# Patient Record
Sex: FEMALE | Race: WHITE | NOT HISPANIC OR LATINO | Employment: OTHER | URBAN - METROPOLITAN AREA
[De-identification: names, ages, dates, MRNs, and addresses within clinical notes are randomized per-mention and may not be internally consistent; named-entity substitution may affect disease eponyms.]

---

## 2017-06-14 ENCOUNTER — OFFICE VISIT (OUTPATIENT)
Dept: OBSTETRICS AND GYNECOLOGY | Facility: CLINIC | Age: 77
End: 2017-06-14
Payer: MEDICARE

## 2017-06-14 VITALS — HEIGHT: 65 IN

## 2017-06-14 DIAGNOSIS — N39.3 URINE, INCONTINENCE, STRESS FEMALE: ICD-10-CM

## 2017-06-14 DIAGNOSIS — N76.2 ACUTE VULVITIS: Primary | ICD-10-CM

## 2017-06-14 PROCEDURE — 87186 SC STD MICRODIL/AGAR DIL: CPT

## 2017-06-14 PROCEDURE — 87077 CULTURE AEROBIC IDENTIFY: CPT

## 2017-06-14 PROCEDURE — 99203 OFFICE O/P NEW LOW 30 MIN: CPT | Mod: PBBFAC,PO | Performed by: NURSE PRACTITIONER

## 2017-06-14 PROCEDURE — 99204 OFFICE O/P NEW MOD 45 MIN: CPT | Mod: S$PBB,,, | Performed by: NURSE PRACTITIONER

## 2017-06-14 PROCEDURE — 87070 CULTURE OTHR SPECIMN AEROBIC: CPT

## 2017-06-14 PROCEDURE — 1159F MED LIST DOCD IN RCRD: CPT | Mod: ,,, | Performed by: NURSE PRACTITIONER

## 2017-06-14 PROCEDURE — 1126F AMNT PAIN NOTED NONE PRSNT: CPT | Mod: ,,, | Performed by: NURSE PRACTITIONER

## 2017-06-14 PROCEDURE — 99999 PR PBB SHADOW E&M-NEW PATIENT-LVL III: CPT | Mod: PBBFAC,,, | Performed by: NURSE PRACTITIONER

## 2017-06-14 RX ORDER — MEMANTINE HYDROCHLORIDE 10 MG/1
5 TABLET ORAL 2 TIMES DAILY
COMMUNITY

## 2017-06-14 RX ORDER — GALANTAMINE HYDROBROMIDE 8 MG/1
8 CAPSULE, EXTENDED RELEASE ORAL
COMMUNITY

## 2017-06-14 RX ORDER — ALPRAZOLAM 0.5 MG/1
0.5 TABLET ORAL
COMMUNITY
Start: 2017-01-03

## 2017-06-14 RX ORDER — LEVOTHYROXINE SODIUM 50 UG/1
TABLET ORAL
COMMUNITY
Start: 2017-04-24

## 2017-06-14 RX ORDER — SERTRALINE HYDROCHLORIDE 50 MG/1
100 TABLET, FILM COATED ORAL
COMMUNITY

## 2017-06-14 RX ORDER — BISOPROLOL FUMARATE AND HYDROCHLOROTHIAZIDE 2.5; 6.25 MG/1; MG/1
TABLET ORAL
COMMUNITY
Start: 2017-02-20

## 2017-06-14 RX ORDER — MELOXICAM 7.5 MG/1
TABLET ORAL
COMMUNITY
Start: 2017-04-24

## 2017-06-14 RX ORDER — SIMVASTATIN 20 MG/1
TABLET, FILM COATED ORAL
COMMUNITY
Start: 2016-11-25

## 2017-06-14 RX ORDER — CELECOXIB 100 MG/1
100 CAPSULE ORAL 2 TIMES DAILY
COMMUNITY
End: 2017-08-01

## 2017-06-14 RX ORDER — QUETIAPINE FUMARATE 25 MG/1
TABLET, FILM COATED ORAL
COMMUNITY
End: 2017-08-01

## 2017-06-16 DIAGNOSIS — B96.89 BV (BACTERIAL VAGINOSIS): Primary | ICD-10-CM

## 2017-06-16 DIAGNOSIS — N76.0 BV (BACTERIAL VAGINOSIS): Primary | ICD-10-CM

## 2017-06-16 LAB — BACTERIA GENITAL AEROBE CULT: NORMAL

## 2017-06-16 RX ORDER — AMOXICILLIN AND CLAVULANATE POTASSIUM 875; 125 MG/1; MG/1
1 TABLET, FILM COATED ORAL 2 TIMES DAILY
Qty: 14 TABLET | Refills: 0 | Status: SHIPPED | OUTPATIENT
Start: 2017-06-16 | End: 2017-06-23

## 2017-06-16 NOTE — PROGRESS NOTES
"Mago Arita is a 76 y.o. female  presents with complaint of vaginal discharge for weeks.  She reports itching.  denies odor.  She states the discharge is really not noted - per daughter but she is incontinent of urine.    Has taken diflucan from pcp - tried to see her gyn but when called was told would be a new pt and no longer taking new pts.   In with her daughter - pt has some dementia.     Past Medical History:   Diagnosis Date    Anxiety     CKD (chronic kidney disease)     Colon polyp     Dementia     Hyperlipidemia     Hypertension     Skin cancer     Thyroid disease      Past Surgical History:   Procedure Laterality Date    BLADDER SUSPENSION      CHOLECYSTECTOMY      HYSTERECTOMY      SHOULDER SURGERY       Social History   Substance Use Topics    Smoking status: Never Smoker    Smokeless tobacco: Not on file    Alcohol use Yes      Comment: OCC Wine     Family History   Problem Relation Age of Onset    Breast cancer Maternal Grandmother      OB History    Para Term  AB Living   7 7       SAB TAB Ectopic Multiple Live Births             # Outcome Date GA Lbr Siddharth/2nd Weight Sex Delivery Anes PTL Lv   7 Para            6 Para            5 Para            4 Para            3 Para            2 Para            1 Para                   Ht 5' 5" (1.651 m)     ROS:  Per hpi    PHYSICAL EXAM:  VULVA: external normal - internal vulva and labia very red but no break down noted, VAGINA:  Atrophic no discharge was noted WET MOUNT done - results: negative for pathogens, normal epithelial cells    ASSESSMENT and PLAN:  1. Acute vulvitis  POCT Wet Prep    Genital Culture   2. Urine, incontinence, stress female       At this time will have them do a better barrier method with her urine incontinence - recommended calmoseptine  awaite culture      Patient was counseled today on vaginitis prevention including :  a. avoiding feminine products such as deoderant soaps, body wash, bubble bath, " douches, scented toilet paper, deoderant tampons or pads, feminine wipes, chronic pad use, etc.  b. avoiding other vulvovaginal irritants such as long hot baths, humidity, tight, synthetic clothing, chlorine and sitting around in wet bathing suits  c. wearing cotton underwear, avoiding thong underwear and no underwear to bed  d. taking showers instead of baths and use a hair dryer on cool setting afterwards to dry  e. wearing cotton to exercise and shower immediately after exercise and change clothes  f. using polyurethane condoms without spermicide if sexually active and symptoms are triggered by intercourse

## 2017-06-26 ENCOUNTER — TELEPHONE (OUTPATIENT)
Dept: OBSTETRICS AND GYNECOLOGY | Facility: CLINIC | Age: 77
End: 2017-06-26

## 2017-06-26 NOTE — TELEPHONE ENCOUNTER
Push out the appt to 4 weeks after she completes meds so if starting today need to see her back in about 5 weeks - a week on meds and 4 weeks off

## 2017-08-01 ENCOUNTER — OFFICE VISIT (OUTPATIENT)
Dept: OBSTETRICS AND GYNECOLOGY | Facility: CLINIC | Age: 77
End: 2017-08-01
Payer: MEDICARE

## 2017-08-01 VITALS
WEIGHT: 175 LBS | HEIGHT: 65 IN | SYSTOLIC BLOOD PRESSURE: 144 MMHG | DIASTOLIC BLOOD PRESSURE: 82 MMHG | BODY MASS INDEX: 29.16 KG/M2

## 2017-08-01 DIAGNOSIS — N76.0 BV (BACTERIAL VAGINOSIS): Primary | ICD-10-CM

## 2017-08-01 DIAGNOSIS — B96.89 BV (BACTERIAL VAGINOSIS): Primary | ICD-10-CM

## 2017-08-01 PROCEDURE — 1159F MED LIST DOCD IN RCRD: CPT | Mod: ,,, | Performed by: NURSE PRACTITIONER

## 2017-08-01 PROCEDURE — 99999 PR PBB SHADOW E&M-EST. PATIENT-LVL III: CPT | Mod: PBBFAC,,, | Performed by: NURSE PRACTITIONER

## 2017-08-01 PROCEDURE — 99214 OFFICE O/P EST MOD 30 MIN: CPT | Mod: S$PBB,,, | Performed by: NURSE PRACTITIONER

## 2017-08-01 PROCEDURE — 87070 CULTURE OTHR SPECIMN AEROBIC: CPT

## 2017-08-01 PROCEDURE — 99213 OFFICE O/P EST LOW 20 MIN: CPT | Mod: PBBFAC,PO | Performed by: NURSE PRACTITIONER

## 2017-08-01 RX ORDER — TRAZODONE HYDROCHLORIDE 50 MG/1
50 TABLET ORAL NIGHTLY
COMMUNITY

## 2017-08-01 RX ORDER — CEPHALEXIN 250 MG/1
CAPSULE ORAL
COMMUNITY
Start: 2017-07-28

## 2017-08-01 RX ORDER — ESTRADIOL 0.1 MG/G
CREAM VAGINAL
COMMUNITY
Start: 2017-07-28

## 2017-08-01 NOTE — PROGRESS NOTES
"  Mago Arita is a 76 y.o. female  presents for recheck from e coli in .  Was seen by urology  - cath ua was negative.  Urology is telling family bacteria coming from vagina.  He did place her on premarin cream and a low dose of keflex every other day.   Daughter states the calmoseptine is really helping her skin externally to vaginal area and under abdomen.  Has resolved all redness.         Past Medical History:   Diagnosis Date    Anxiety     CKD (chronic kidney disease)     Colon polyp     Dementia     Hyperlipidemia     Hypertension     Skin cancer     Thyroid disease      Past Surgical History:   Procedure Laterality Date    BLADDER SUSPENSION      CHOLECYSTECTOMY      HYSTERECTOMY      SHOULDER SURGERY       Family History   Problem Relation Age of Onset    Breast cancer Maternal Grandmother      Social History   Substance Use Topics    Smoking status: Never Smoker    Smokeless tobacco: Never Used    Alcohol use Yes      Comment: socially     OB History      Para Term  AB Living    7 7 7          SAB TAB Ectopic Multiple Live Births                       BP (!) 144/82   Ht 5' 5" (1.651 m)   Wt 79.4 kg (175 lb)   BMI 29.12 kg/m²     ROS:  Per hpi    PE:   APPEARANCE: Well nourished, well developed, in no acute distress.  AFFECT: WNL, alert and oriented x 3.  PELVIC: Normal external female genitalia without lesions. Normal hair distribution. Adequate perineal body, normal urethral meatus. Vagina atrophic without lesions or discharge. No significant cystocele or rectocele. Bimanual exam shows uterus and cervix to be surgically absent.     1. BV (bacterial vaginosis)  Genital Culture    and PLAN:    Will await culture although they have been using premarin cream vaginally  Want her to continue her use of the premarin cream and the antibiotic as prescribed               "

## 2017-08-05 LAB — BACTERIA GENITAL AEROBE CULT: NORMAL
